# Patient Record
Sex: MALE | Race: OTHER | Employment: FULL TIME | URBAN - NONMETROPOLITAN AREA
[De-identification: names, ages, dates, MRNs, and addresses within clinical notes are randomized per-mention and may not be internally consistent; named-entity substitution may affect disease eponyms.]

---

## 2024-07-13 ENCOUNTER — APPOINTMENT (EMERGENCY)
Dept: RADIOLOGY | Facility: HOSPITAL | Age: 39
End: 2024-07-13
Payer: COMMERCIAL

## 2024-07-13 ENCOUNTER — HOSPITAL ENCOUNTER (EMERGENCY)
Facility: HOSPITAL | Age: 39
Discharge: HOME | End: 2024-07-13
Attending: EMERGENCY MEDICINE | Admitting: EMERGENCY MEDICINE
Payer: COMMERCIAL

## 2024-07-13 VITALS
OXYGEN SATURATION: 97 % | BODY MASS INDEX: 25.73 KG/M2 | DIASTOLIC BLOOD PRESSURE: 57 MMHG | HEART RATE: 52 BPM | WEIGHT: 190 LBS | SYSTOLIC BLOOD PRESSURE: 113 MMHG | RESPIRATION RATE: 16 BRPM | HEIGHT: 72 IN | TEMPERATURE: 98.2 F

## 2024-07-13 DIAGNOSIS — S93.401A RIGHT ANKLE SPRAIN: Primary | ICD-10-CM

## 2024-07-13 PROCEDURE — 99284 EMERGENCY DEPT VISIT MOD MDM: CPT | Performed by: EMERGENCY MEDICINE

## 2024-07-13 PROCEDURE — 73610 X-RAY EXAM OF ANKLE: CPT

## 2024-07-13 PROCEDURE — 99283 EMERGENCY DEPT VISIT LOW MDM: CPT

## 2024-07-13 RX ORDER — IBUPROFEN 600 MG/1
600 TABLET ORAL ONCE
Status: COMPLETED | OUTPATIENT
Start: 2024-07-13 | End: 2024-07-13

## 2024-07-13 RX ORDER — IBUPROFEN 600 MG/1
600 TABLET ORAL EVERY 6 HOURS PRN
Qty: 30 TABLET | Refills: 0 | Status: SHIPPED | OUTPATIENT
Start: 2024-07-13

## 2024-07-13 RX ADMIN — IBUPROFEN 600 MG: 600 TABLET, FILM COATED ORAL at 01:36

## 2024-07-13 NOTE — Clinical Note
Serafin Sanchez was seen and treated in our emergency department on 7/13/2024.                Diagnosis:     Serafin  may return to work on return date.    He may return on this date: 07/15/2024    Patient seen and examined emergency department on 7/13/2024.  Please excuse absence from work 7/13/2024, 7/14/2024.  Patient may return to work 7/15/2024.  Weightbearing as tolerated.     If you have any questions or concerns, please don't hesitate to call.      Jordi May, DO    ______________________________           _______________          _______________  Hospital Representative                              Date                                Time

## 2024-07-13 NOTE — ED PROVIDER NOTES
History  Chief Complaint   Patient presents with    Ankle Injury     Pt fell yesterday playing basketball. Jumped in the air, coming back down, twisted ankle. Pain and swelling. Not taking anything for pain.       Ankle Injury  Associated symptoms: no abdominal pain, no chest pain, no cough, no ear pain, no fever, no rash, no shortness of breath, no sore throat and no vomiting    This is a 39-year-old male who presents the ER for evaluation of right ankle injury.  Patient reports injury occurred roughly 6 hours ago.  He was playing basketball he went for a lay up.  He reports that he went down on his ankle which rolled upon landing.  He reports he has been able to bear weight but has had swelling and persistent pain to the right ankle particular over the lateral aspect.  Denies prior injury.  Denies numbness or tingling.  Denies other injury.    None       No past medical history on file.    No past surgical history on file.    No family history on file.  I have reviewed and agree with the history as documented.    No existing history information found.  No existing history information found.       Review of Systems   Constitutional:  Negative for chills and fever.   HENT:  Negative for ear pain and sore throat.    Eyes:  Negative for pain and visual disturbance.   Respiratory:  Negative for cough and shortness of breath.    Cardiovascular:  Negative for chest pain and palpitations.   Gastrointestinal:  Negative for abdominal pain and vomiting.   Genitourinary:  Negative for dysuria and hematuria.   Musculoskeletal:  Negative for arthralgias and back pain.        R ankle pain and swelling   Skin:  Negative for color change and rash.   Neurological:  Negative for seizures and syncope.   All other systems reviewed and are negative.      Physical Exam  Physical Exam  Vitals and nursing note reviewed. Exam conducted with a chaperone present.   Constitutional:       General: He is not in acute distress.     Appearance:  Normal appearance. He is well-developed. He is not ill-appearing, toxic-appearing or diaphoretic.   HENT:      Head: Normocephalic and atraumatic.      Right Ear: External ear normal.      Left Ear: External ear normal.   Eyes:      Conjunctiva/sclera: Conjunctivae normal.   Cardiovascular:      Rate and Rhythm: Normal rate and regular rhythm.      Heart sounds: No murmur heard.  Pulmonary:      Effort: Pulmonary effort is normal. No respiratory distress.      Breath sounds: Normal breath sounds.   Abdominal:      Palpations: Abdomen is soft.      Tenderness: There is no abdominal tenderness.   Musculoskeletal:         General: No swelling.      Cervical back: Neck supple.      Right ankle: Swelling present. No deformity, ecchymosis or lacerations. Tenderness present over the lateral malleolus. No medial malleolus, base of 5th metatarsal or proximal fibula tenderness. Decreased range of motion. Anterior drawer test negative. Normal pulse.      Right Achilles Tendon: Normal.      Left ankle: Normal.      Right foot: Normal range of motion and normal capillary refill. No swelling, foot drop, tenderness or bony tenderness. Normal pulse.   Skin:     General: Skin is warm and dry.      Capillary Refill: Capillary refill takes less than 2 seconds.   Neurological:      Mental Status: He is alert.   Psychiatric:         Mood and Affect: Mood normal.         Vital Signs  ED Triage Vitals   Temperature Pulse Respirations Blood Pressure SpO2   07/13/24 0045 07/13/24 0030 07/13/24 0030 07/13/24 0030 07/13/24 0030   98.2 °F (36.8 °C) 61 16 128/74 98 %      Temp src Heart Rate Source Patient Position - Orthostatic VS BP Location FiO2 (%)   -- 07/13/24 0030 07/13/24 0030 07/13/24 0030 --    Monitor Sitting Left arm       Pain Score       07/13/24 0030       5           Vitals:    07/13/24 0030 07/13/24 0100   BP: 128/74 113/57   Pulse: 61 (!) 52   Patient Position - Orthostatic VS: Sitting Sitting         Visual Acuity      ED  Medications  Medications   ibuprofen (MOTRIN) tablet 600 mg (600 mg Oral Given 7/13/24 0136)       Diagnostic Studies  Results Reviewed       None                   XR ankle 3+ views RIGHT   ED Interpretation by Jordi May DO (07/13 0152)   X-rays of the right ankle interpreted by myself pending official radiology report.  No acute osseous abnormality seen.                 Procedures  Procedures         ED Course                                 SBIRT 22yo+      Flowsheet Row Most Recent Value   Initial Alcohol Screen: US AUDIT-C     1. How often do you have a drink containing alcohol? 0 Filed at: 07/13/2024 0036   2. How many drinks containing alcohol do you have on a typical day you are drinking?  0 Filed at: 07/13/2024 0036   3a. Male UNDER 65: How often do you have five or more drinks on one occasion? 0 Filed at: 07/13/2024 0036   Audit-C Score 0 Filed at: 07/13/2024 0036   JOAN: How many times in the past year have you...    Used an illegal drug or used a prescription medication for non-medical reasons? Never Filed at: 07/13/2024 0036                      Medical Decision Making  39-year-old male right ankle sprain, soft tissue swelling on exam restricted range of motion and soft tissue tenderness slight lateral malleoli tenderness able to bear weight.  Screening x-ray reveals no fracture.  Likely lateral ankle sprain we will proceed with immobilization with Aircast, crutches with weightbearing as tolerated, NSAIDs, ice, elevation, orthopedic follow-up as needed for persistent symptoms discussed expected course will plan for discharge.  No evidence of compartment syndrome or neurovascular compromise.    Problems Addressed:  Right ankle sprain: acute illness or injury    Amount and/or Complexity of Data Reviewed  Radiology: ordered.    Risk  Prescription drug management.                 Disposition  Final diagnoses:   Right ankle sprain     Time reflects when diagnosis was documented in both MDM as  applicable and the Disposition within this note       Time User Action Codes Description Comment    7/13/2024  1:51 AM Jordi May Add [S93.401A] Right ankle sprain           ED Disposition       ED Disposition   Discharge    Condition   Stable    Date/Time   Sat Jul 13, 2024 0151    Comment   Serafin Sanchez discharge to home/self care.                   Follow-up Information       Follow up With Specialties Details Why Contact Info Additional Information    [facility] Schedule an appointment as soon as possible for a visit   [address] [place] [id number] [phone] [facility] [address], [address] [phone]            Patient's Medications   Discharge Prescriptions    IBUPROFEN (MOTRIN) 600 MG TABLET    Take 1 tablet (600 mg total) by mouth every 6 (six) hours as needed for mild pain       Start Date: 7/13/2024 End Date: --       Order Dose: 600 mg       Quantity: 30 tablet    Refills: 0           PDMP Review       None            ED Provider  Electronically Signed by             Jordi May DO  07/13/24 0155

## 2024-07-13 NOTE — DISCHARGE INSTRUCTIONS
Thank you for visiting the Emergency Department today.    No fracture seen on x-ray.  Consistent with an ankle sprain.  Symptoms may persist for days to a few weeks.  Ibuprofen ice elevation weightbearing as tolerated  Follow-up with orthopedics if symptoms persist or worsen.  Return here for reinjury or other concerns.

## 2024-10-02 ENCOUNTER — TELEPHONE (OUTPATIENT)
Dept: DENTISTRY | Facility: CLINIC | Age: 39
End: 2024-10-02

## 2024-10-02 NOTE — TELEPHONE ENCOUNTER
Called PT to discuss the status of our dental program and update PT chart re: insurance info.  Phone seemed to connect but then when dead and I was unable to LVM.    SB

## 2024-10-23 ENCOUNTER — OFFICE VISIT (OUTPATIENT)
Dept: DENTISTRY | Facility: CLINIC | Age: 39
End: 2024-10-23

## 2024-10-23 DIAGNOSIS — Z91.843 RISK FOR DENTAL CARIES, HIGH: Primary | ICD-10-CM

## 2024-10-23 PROCEDURE — D0150 COMPREHENSIVE ORAL EVALUATION - NEW OR ESTABLISHED PATIENT: HCPCS

## 2024-10-23 PROCEDURE — D0210 INTRAORAL - COMPLETE SERIES OF RADIOGRAPHIC IMAGES: HCPCS

## 2024-10-23 NOTE — DENTAL PROCEDURE DETAILS
COMP EXAM, FMX, PROBE EXAM   [place] Interpretor: Josefina [id number]    REVIEWED MED HX: meds, allergies, health changes reviewed in EPIC  CHIEF CONCERN:     -Last dental visit was about 10 years ago   - patient is concerned about his gums bleeding when he wakes up and brushes his teeth  PAIN SCALE:  3  ASA CLASS:  ASA 1 - Normal health patient  PLAQUE:  moderate  CALCULUS: Generalized  BLEEDING:  heavy  STAIN :  Generalized  PERIO: Chronic Periodontitis Stage 3 Grade B  Discussed with patient that his bad breath and bleeding are attributed to his periodontal disease- stressed the importance of brushing twice daily in circular motions and flossing daily    Visual and Tactile Intraoral/ Extraoral evaluation: Oral and Oropharyngeal cancer evaluation. No findings     Dr. Whitaker -  Reviewed with patient clinical and radiographic findings and patient verbalized understanding. All questions and concerns addressed.     REFERRALS: PAN needs to be taken at next visit or next restorative visit and referral for extraction of wisdom teeth 1, 16, 17 and 32    CARIES FINDINGS: 14-O, 19-DO, 16-O  Abfraction lesions: 2, 3, 4, 5, 6, 11, 12, 13, 14, 20, 21, 22, 27, 28, 29, 30  Chipping of maxillary incisiors 8 and 9  Patient states that he is unaware if he grinds his teeth at night- recommended an OTC  to protect his teeth against wear and chipping       NEXT VISIT:   1) Start SRPS  2) SRPs  3) Caries control  4) Perio maintenance  5) Follow up with abfraction lesions if patient would like to treat them      Last FMX : 10/23/24

## 2024-11-01 ENCOUNTER — TELEPHONE (OUTPATIENT)
Dept: DENTISTRY | Facility: CLINIC | Age: 39
End: 2024-11-01

## 2024-11-26 ENCOUNTER — TELEPHONE (OUTPATIENT)
Dept: DENTISTRY | Facility: CLINIC | Age: 39
End: 2024-11-26

## 2024-11-26 NOTE — TELEPHONE ENCOUNTER
Attempted to reach PT using [employer] ([place] [phone]) but MB full - then LVM on spouse's phone asking for return call re: upcoming appt. SB